# Patient Record
Sex: FEMALE | Race: WHITE | NOT HISPANIC OR LATINO
[De-identification: names, ages, dates, MRNs, and addresses within clinical notes are randomized per-mention and may not be internally consistent; named-entity substitution may affect disease eponyms.]

---

## 2020-04-26 ENCOUNTER — MESSAGE (OUTPATIENT)
Age: 30
End: 2020-04-26

## 2020-05-08 ENCOUNTER — APPOINTMENT (OUTPATIENT)
Dept: DISASTER EMERGENCY | Facility: HOSPITAL | Age: 30
End: 2020-05-08

## 2020-05-08 LAB
SARS-COV-2 IGG SERPL IA-ACNC: <0.1 INDEX
SARS-COV-2 IGG SERPL QL IA: NEGATIVE

## 2020-11-29 ENCOUNTER — EMERGENCY (EMERGENCY)
Facility: HOSPITAL | Age: 30
LOS: 1 days | Discharge: HOME | End: 2020-11-29
Attending: EMERGENCY MEDICINE | Admitting: EMERGENCY MEDICINE
Payer: COMMERCIAL

## 2020-11-29 VITALS
RESPIRATION RATE: 15 BRPM | OXYGEN SATURATION: 100 % | HEART RATE: 89 BPM | DIASTOLIC BLOOD PRESSURE: 91 MMHG | SYSTOLIC BLOOD PRESSURE: 136 MMHG

## 2020-11-29 VITALS
OXYGEN SATURATION: 100 % | HEART RATE: 99 BPM | RESPIRATION RATE: 16 BRPM | SYSTOLIC BLOOD PRESSURE: 138 MMHG | DIASTOLIC BLOOD PRESSURE: 89 MMHG | TEMPERATURE: 98 F

## 2020-11-29 PROCEDURE — 99283 EMERGENCY DEPT VISIT LOW MDM: CPT

## 2020-11-29 PROCEDURE — 72170 X-RAY EXAM OF PELVIS: CPT | Mod: 26

## 2020-11-29 PROCEDURE — 72100 X-RAY EXAM L-S SPINE 2/3 VWS: CPT | Mod: 26

## 2020-11-29 RX ORDER — LIDOCAINE 4 G/100G
1 CREAM TOPICAL ONCE
Refills: 0 | Status: COMPLETED | OUTPATIENT
Start: 2020-11-29 | End: 2020-11-29

## 2020-11-29 RX ORDER — IBUPROFEN 200 MG
1 TABLET ORAL
Qty: 25 | Refills: 0
Start: 2020-11-29

## 2020-11-29 RX ORDER — DIAZEPAM 5 MG
5 TABLET ORAL ONCE
Refills: 0 | Status: DISCONTINUED | OUTPATIENT
Start: 2020-11-29 | End: 2020-11-29

## 2020-11-29 RX ORDER — LIDOCAINE 4 G/100G
1 CREAM TOPICAL
Qty: 12 | Refills: 0
Start: 2020-11-29

## 2020-11-29 RX ORDER — KETOROLAC TROMETHAMINE 30 MG/ML
30 SYRINGE (ML) INJECTION ONCE
Refills: 0 | Status: DISCONTINUED | OUTPATIENT
Start: 2020-11-29 | End: 2020-11-29

## 2020-11-29 RX ORDER — DIAZEPAM 5 MG
1 TABLET ORAL
Qty: 10 | Refills: 0
Start: 2020-11-29

## 2020-11-29 RX ADMIN — Medication 30 MILLIGRAM(S): at 20:29

## 2020-11-29 RX ADMIN — LIDOCAINE 1 PATCH: 4 CREAM TOPICAL at 20:26

## 2020-11-29 RX ADMIN — Medication 5 MILLIGRAM(S): at 20:25

## 2020-11-29 NOTE — ED ADULT TRIAGE NOTE - CHIEF COMPLAINT QUOTE
pt c/o lower back pain, bilateral leg pain s/p mechanical fall down 3 steps yesterday. pt denies hitting head, LOC. pt arrives in wheelchair, stating it is difficult to walk. No PMH

## 2020-11-29 NOTE — ED PROVIDER NOTE - OBJECTIVE STATEMENT
patient is a 29 y/o F with no sig pmhx presenting with c.o lower back s/p fall down 3 step sustained yesterday. She report pain to the right lumbosacral region, burning in nature, difficulty with ambulating. She denies sustaining head trauma, no LOC, posttraumatic headache/dizziness, n/v, numbness, weakness, urinary/fecal incontinence saddle anesthesia.

## 2020-11-29 NOTE — ED PROVIDER NOTE - ATTENDING CONTRIBUTION TO CARE
DR. LAWS, ATTENDING MD-  I performed a face to face bedside interview with the patient regarding history of present illness, review of symptoms and past medical history. I completed an independent physical exam.  I have discussed the patient's plan of care with the PA.   Documentation as above in the note.    31 y/o female s/p fall down 3 steps yesterday with low back pain.  Non ttp spinous midline, contusion noted at right paraspinal lumbar region, coccyx ttp, motor 5/5 x4 ext, distal sensory grossly intact x4 ext, antalgic gait, normal heel to shin, normal finger to nose.  Obtain xr lspine  pelvis give pain med reassess, antic dc with pcp f/u.

## 2020-11-29 NOTE — ED PROVIDER NOTE - MUSCULOSKELETAL, MLM
back: tenderness and bruising to the right lumbosacral region. no midline spinal tenderness. lower extremities strength 5/5, sensation intact. no saddle anesthesia  patient able to ambulate.

## 2020-11-29 NOTE — ED PROVIDER NOTE - ADDITIONAL NOTES AND INSTRUCTIONS:
Patient was elevated at [facility] for non-COVID medical complaint. She is medically clear to return to work 12/2/2020 without limitations or restrictions. Patient was elevated at [facility] for non-COVID medical complaint. She is medically clear to return to work 12/7/2020 without limitations or restrictions.

## 2020-11-29 NOTE — ED PROVIDER NOTE - PATIENT PORTAL LINK FT
You can access the FollowMyHealth Patient Portal offered by [facility] by registering at the following website: [email or web address]. By joining [facility]’s FollowMyHealth portal, you will also be able to view your health information using other applications (apps) compatible with our system.

## 2020-11-29 NOTE — ED ADULT NURSE NOTE - NSIMPLEMENTINTERV_GEN_ALL_ED
Implemented All Fall Risk Interventions:  [place] to call system. Call bell, personal items and telephone within reach. Instruct patient to call for assistance. Room bathroom lighting operational. Non-slip footwear when patient is off stretcher. Physically safe environment: no spills, clutter or unnecessary equipment. Stretcher in lowest position, wheels locked, appropriate side rails in place. Provide visual cue, wrist band, yellow gown, etc. Monitor gait and stability. Monitor for mental status changes and reorient to person, place, and time. Review medications for side effects contributing to fall risk. Reinforce activity limits and safety measures with patient and family.

## 2020-11-29 NOTE — ED PROVIDER NOTE - CLINICAL SUMMARY MEDICAL DECISION MAKING FREE TEXT BOX
patient with back contusion s/p fall down 3 steps. PE unremarkable for midline spinal tenderness, bony deformities, or focal deficits. plan for analgesic, xray of LSPINE and pelvis

## 2020-11-29 NOTE — ED ADULT NURSE NOTE - OBJECTIVE STATEMENT
pt received to intake, a&ox 3, ambulatory, denies pmh , p/w back pain s/p fall. Pt breathing even and unlabored on room air. Denies fever, chills, cough, SOB, chest pain, palpitations, dizziness, N/V/D, constipation, numbness, tingling. pending UCG, pain medication, Xray.

## 2020-12-24 ENCOUNTER — APPOINTMENT (OUTPATIENT)
Dept: MAMMOGRAPHY | Facility: CLINIC | Age: 30
End: 2020-12-24

## 2020-12-24 ENCOUNTER — APPOINTMENT (OUTPATIENT)
Dept: ULTRASOUND IMAGING | Facility: CLINIC | Age: 30
End: 2020-12-24

## 2021-04-19 ENCOUNTER — RESULT REVIEW (OUTPATIENT)
Age: 31
End: 2021-04-19

## 2021-06-03 ENCOUNTER — APPOINTMENT (OUTPATIENT)
Dept: MAMMOGRAPHY | Facility: IMAGING CENTER | Age: 31
End: 2021-06-03
Payer: COMMERCIAL

## 2021-06-03 ENCOUNTER — OUTPATIENT (OUTPATIENT)
Dept: OUTPATIENT SERVICES | Facility: HOSPITAL | Age: 31
LOS: 1 days | End: 2021-06-03
Payer: COMMERCIAL

## 2021-06-03 ENCOUNTER — APPOINTMENT (OUTPATIENT)
Dept: ULTRASOUND IMAGING | Facility: IMAGING CENTER | Age: 31
End: 2021-06-03
Payer: COMMERCIAL

## 2021-06-03 DIAGNOSIS — Z00.8 ENCOUNTER FOR OTHER GENERAL EXAMINATION: ICD-10-CM

## 2021-06-03 PROCEDURE — 76641 ULTRASOUND BREAST COMPLETE: CPT | Mod: 26,50

## 2021-06-03 PROCEDURE — 77067 SCR MAMMO BI INCL CAD: CPT | Mod: 26

## 2021-06-03 PROCEDURE — 77063 BREAST TOMOSYNTHESIS BI: CPT | Mod: 26

## 2021-06-03 PROCEDURE — 76641 ULTRASOUND BREAST COMPLETE: CPT

## 2021-06-03 PROCEDURE — 77067 SCR MAMMO BI INCL CAD: CPT

## 2021-06-03 PROCEDURE — 77063 BREAST TOMOSYNTHESIS BI: CPT

## 2021-06-17 ENCOUNTER — OUTPATIENT (OUTPATIENT)
Dept: OUTPATIENT SERVICES | Facility: HOSPITAL | Age: 31
LOS: 1 days | End: 2021-06-17
Payer: COMMERCIAL

## 2021-06-17 ENCOUNTER — APPOINTMENT (OUTPATIENT)
Dept: ULTRASOUND IMAGING | Facility: IMAGING CENTER | Age: 31
End: 2021-06-17
Payer: COMMERCIAL

## 2021-06-17 DIAGNOSIS — Z00.8 ENCOUNTER FOR OTHER GENERAL EXAMINATION: ICD-10-CM

## 2021-06-17 PROCEDURE — 76642 ULTRASOUND BREAST LIMITED: CPT

## 2021-06-17 PROCEDURE — 76642 ULTRASOUND BREAST LIMITED: CPT | Mod: 26,50

## 2021-06-21 ENCOUNTER — TRANSCRIPTION ENCOUNTER (OUTPATIENT)
Age: 31
End: 2021-06-21

## 2021-06-30 ENCOUNTER — APPOINTMENT (OUTPATIENT)
Dept: ULTRASOUND IMAGING | Facility: IMAGING CENTER | Age: 31
End: 2021-06-30
Payer: COMMERCIAL

## 2021-06-30 ENCOUNTER — RESULT REVIEW (OUTPATIENT)
Age: 31
End: 2021-06-30

## 2021-06-30 ENCOUNTER — OUTPATIENT (OUTPATIENT)
Dept: OUTPATIENT SERVICES | Facility: HOSPITAL | Age: 31
LOS: 1 days | End: 2021-06-30
Payer: COMMERCIAL

## 2021-06-30 DIAGNOSIS — N63.0 UNSPECIFIED LUMP IN UNSPECIFIED BREAST: ICD-10-CM

## 2021-06-30 DIAGNOSIS — Z00.8 ENCOUNTER FOR OTHER GENERAL EXAMINATION: ICD-10-CM

## 2021-06-30 PROCEDURE — 19083 BX BREAST 1ST LESION US IMAG: CPT

## 2021-06-30 PROCEDURE — 77065 DX MAMMO INCL CAD UNI: CPT

## 2021-06-30 PROCEDURE — 88305 TISSUE EXAM BY PATHOLOGIST: CPT | Mod: 26

## 2021-06-30 PROCEDURE — 88305 TISSUE EXAM BY PATHOLOGIST: CPT

## 2021-06-30 PROCEDURE — 77065 DX MAMMO INCL CAD UNI: CPT | Mod: 26,LT

## 2021-06-30 PROCEDURE — 19083 BX BREAST 1ST LESION US IMAG: CPT | Mod: LT

## 2021-06-30 PROCEDURE — A4648: CPT

## 2021-07-12 ENCOUNTER — NON-APPOINTMENT (OUTPATIENT)
Age: 31
End: 2021-07-12

## 2021-07-12 ENCOUNTER — APPOINTMENT (OUTPATIENT)
Dept: CARDIOLOGY | Facility: CLINIC | Age: 31
End: 2021-07-12
Payer: COMMERCIAL

## 2021-07-12 VITALS — DIASTOLIC BLOOD PRESSURE: 80 MMHG | SYSTOLIC BLOOD PRESSURE: 130 MMHG

## 2021-07-12 VITALS
OXYGEN SATURATION: 98 % | TEMPERATURE: 97 F | WEIGHT: 191 LBS | HEIGHT: 63 IN | BODY MASS INDEX: 33.84 KG/M2 | HEART RATE: 94 BPM

## 2021-07-12 DIAGNOSIS — J30.9 ALLERGIC RHINITIS, UNSPECIFIED: ICD-10-CM

## 2021-07-12 DIAGNOSIS — R01.1 CARDIAC MURMUR, UNSPECIFIED: ICD-10-CM

## 2021-07-12 DIAGNOSIS — Z80.3 FAMILY HISTORY OF MALIGNANT NEOPLASM OF BREAST: ICD-10-CM

## 2021-07-12 DIAGNOSIS — Z82.49 FAMILY HISTORY OF ISCHEMIC HEART DISEASE AND OTHER DISEASES OF THE CIRCULATORY SYSTEM: ICD-10-CM

## 2021-07-12 DIAGNOSIS — Z80.1 FAMILY HISTORY OF MALIGNANT NEOPLASM OF TRACHEA, BRONCHUS AND LUNG: ICD-10-CM

## 2021-07-12 DIAGNOSIS — R06.89 OTHER ABNORMALITIES OF BREATHING: ICD-10-CM

## 2021-07-12 DIAGNOSIS — Z78.9 OTHER SPECIFIED HEALTH STATUS: ICD-10-CM

## 2021-07-12 DIAGNOSIS — Z87.898 PERSONAL HISTORY OF OTHER SPECIFIED CONDITIONS: ICD-10-CM

## 2021-07-12 DIAGNOSIS — S32.009A UNSPECIFIED FRACTURE OF UNSPECIFIED LUMBAR VERTEBRA, INITIAL ENCOUNTER FOR CLOSED FRACTURE: ICD-10-CM

## 2021-07-12 DIAGNOSIS — U07.1 COVID-19: ICD-10-CM

## 2021-07-12 PROCEDURE — 99204 OFFICE O/P NEW MOD 45 MIN: CPT

## 2021-07-12 PROCEDURE — 99072 ADDL SUPL MATRL&STAF TM PHE: CPT

## 2021-07-12 PROCEDURE — 93000 ELECTROCARDIOGRAM COMPLETE: CPT

## 2021-07-12 NOTE — ASSESSMENT
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  It is important to rule out cardiomyopathy as a potential cause of her arrhythmias.  She needs echo to evaluate right and left ventricular size and function and a telemetry monitor to assess the type and severity and frequency of the arrhythmia.

## 2021-07-12 NOTE — DISCUSSION/SUMMARY
[FreeTextEntry1] : I scheduled her for echocardiogram as well as a telemetry monitor for a month.  Based on the results further recommendations will follow.\par \par I highlighted the importance of diet and exercise to lose weight.

## 2021-07-12 NOTE — HISTORY OF PRESENT ILLNESS
[FreeTextEntry1] : 30 yr old lady came for cardiac evaluation on her own.  She was being followed in Dr. Detweiler's office.  She had history of heart murmur while growing up but it never bothered her..\par \par She was in her usual state of health until 28 November 2020 when she had Covid infection.  She had mild fever, shortness of breath, cough and chest discomfort while coughing.  In addition, she had loss of sense of taste and smell.   She stayed at home for about 10 days and symptoms subsided.  She got the first dose of Covid in 19 vaccine on December 31 and the second dose on January 28.\par \par On 26 November i.e. two days before getting Covid infection, she had an accidental fall down a flight of steps and fractured her lumbar spine.  She was not given any brace.  She rested for 3 weeks and then started walking and went back to work.  She was given painkillers and muscle relaxants.\par \par After the COVID-19 infection she started getting palpitation.  She feels extra beat and then there is a short pause and then she feels racing. The duration of racing may be from few seconds to an hour.  Sometimes there is dizziness and shortness of breath associated with the palpitation.  Some days she gets palpitation and other days she does not.  She can have days when she has palpitation several times in a day.\par \par Sometimes she has problem breathing.  She is getting frequent yawning and feels that it is not deep enough and feels short of breath and sometimes actually feels nauseous and gags.  She cannot catch her breath "anymore".  There is no history of syncope.  Lately the symptoms have been getting worse.  There is no leg edema.\par \par She gained about 10 to 15 pounds prior to fractured spine and Covid.  Since then she gained an additional 20 pounds.

## 2021-07-27 ENCOUNTER — APPOINTMENT (OUTPATIENT)
Dept: CARDIOLOGY | Facility: CLINIC | Age: 31
End: 2021-07-27

## 2021-08-30 ENCOUNTER — APPOINTMENT (OUTPATIENT)
Dept: CARDIOLOGY | Facility: CLINIC | Age: 31
End: 2021-08-30

## 2021-11-02 ENCOUNTER — RESULT REVIEW (OUTPATIENT)
Age: 31
End: 2021-11-02

## 2021-11-23 ENCOUNTER — APPOINTMENT (OUTPATIENT)
Dept: COLORECTAL SURGERY | Facility: CLINIC | Age: 31
End: 2021-11-23
Payer: COMMERCIAL

## 2021-11-23 VITALS
OXYGEN SATURATION: 95 % | BODY MASS INDEX: 32.78 KG/M2 | HEIGHT: 63 IN | WEIGHT: 185 LBS | HEART RATE: 81 BPM | RESPIRATION RATE: 14 BRPM | TEMPERATURE: 97.2 F | DIASTOLIC BLOOD PRESSURE: 69 MMHG | SYSTOLIC BLOOD PRESSURE: 102 MMHG

## 2021-11-23 DIAGNOSIS — R10.9 UNSPECIFIED ABDOMINAL PAIN: ICD-10-CM

## 2021-11-23 DIAGNOSIS — R14.0 ABDOMINAL DISTENSION (GASEOUS): ICD-10-CM

## 2021-11-23 DIAGNOSIS — K59.00 CONSTIPATION, UNSPECIFIED: ICD-10-CM

## 2021-11-23 DIAGNOSIS — Z78.9 OTHER SPECIFIED HEALTH STATUS: ICD-10-CM

## 2021-11-23 PROCEDURE — 99203 OFFICE O/P NEW LOW 30 MIN: CPT | Mod: 25

## 2021-11-23 PROCEDURE — 46600 DIAGNOSTIC ANOSCOPY SPX: CPT

## 2021-11-23 NOTE — HISTORY OF PRESENT ILLNESS
[FreeTextEntry1] : 32yo F pt presents with abd pain, pressure and bloating since Oct 28, pt has been feeling constipated since Halloween weekend, took milk of magnesia to have a BM and has been taking miralax and stopped just in the past 5 days. Says when pt tries to pass gas or has BM feels pressure on the lower abd and anus, strains with BM and doesn't feel fully evacuated. When pt sits down or presses on abd it feels bruised. Pt says the pressure and pain issue began after having intercourse with her boyfriend, has been visiting her OB/GYN and had a lot of tests done, all found normal. Hx of dealing with constipation on/off for her life. Denies family hx of colon CA, crohn's, UC.\par Tolerating diet no nausea or vomiting no fevers or chills

## 2021-11-23 NOTE — ASSESSMENT
[FreeTextEntry1] : Recent onset of abdominal pain and cramping constipation\par -No clear pathologic reason for change in bowel habits \par -Recommended patient attempt conservative therapy as her symptoms are relatively short lived\par -Patient will start fiber supplementation daily MiraLax as needed\par -If no improvement in the next 2-4 weeks, patient will followup in the office we will likely schedule abdominal CT scan and colonoscopy at that time\par -Patient agreed to this plan and will followup in the office as described\par -All questions were answered

## 2021-11-23 NOTE — CONSULT LETTER
[Dear  ___] : Dear  [unfilled], [Consult Letter:] : I had the pleasure of evaluating your patient, [unfilled]. [Please see my note below.] : Please see my note below. [Consult Closing:] : Thank you very much for allowing me to participate in the care of this patient.  If you have any questions, please do not hesitate to contact me. [Sincerely,] : Sincerely, [FreeTextEntry2] : Allan Detweiler [FreeTextEntry3] : Mike Callaway MD FACS\par Chief Colon and Rectal Surgery\par [facility]

## 2021-11-23 NOTE — PHYSICAL EXAM
[Normal Breath Sounds] : Normal breath sounds [Normal Heart Sounds] : normal heart sounds [Normal Rate and Rhythm] : normal rate and rhythm [Alert] : alert [Oriented to Person] : oriented to person [Oriented to Place] : oriented to place [Oriented to Time] : oriented to time [Calm] : calm [de-identified] : round soft +BS NT [de-identified] : well nourished female [de-identified] : NC/AT [de-identified] : +ROM [de-identified] : intact

## 2021-12-21 NOTE — ED ADULT TRIAGE NOTE - SOURCE OF INFORMATION
[FreeTextEntry1] : 37 yo female presents for high risk screening; she is without complaint. Discussed with the patient the implications for her lifetime risk, which is considered to be at high risk to develop breast cancer over the span of her lifetime and it is recommended that she undergoes high risk screening surveillance to include biannual radiological screening exams with a mammogram and screening MRI. Most recent MRI was BI-RADS 3 for b/l enhancement-- given recent benign mmg & US and stable breast exam, will have her proceed with the MRI in June 2022. 
Patient

## 2022-03-29 ENCOUNTER — APPOINTMENT (OUTPATIENT)
Dept: CARDIOLOGY | Facility: CLINIC | Age: 32
End: 2022-03-29

## 2022-04-25 NOTE — ED ADULT TRIAGE NOTE - PAIN RATING/NUMBER SCALE (0-10): REST
Medical Week 1 Survey    Flowsheet Row Responses   [facility] facility patient discharged from? [facility]   Does the patient have one of the following disease processes/diagnoses(primary or secondary)? Other   Week 1 attempt successful? No   Unsuccessful attempts Attempt 1          DARREN CARRILLO - Registered Nurse   10

## 2022-05-09 ENCOUNTER — OUTPATIENT (OUTPATIENT)
Dept: OUTPATIENT SERVICES | Facility: HOSPITAL | Age: 32
LOS: 1 days | Discharge: STILL ADMITTED OR ADMINISTRATIVE | End: 2022-05-09

## 2022-05-09 DIAGNOSIS — F43.23 ADJUSTMENT DISORDER WITH MIXED ANXIETY AND DEPRESSED MOOD: ICD-10-CM

## 2022-05-09 PROCEDURE — 90839 PSYTX CRISIS INITIAL 60 MIN: CPT | Mod: NC,1L,95

## 2022-05-12 ENCOUNTER — APPOINTMENT (OUTPATIENT)
Dept: PEDIATRIC ALLERGY IMMUNOLOGY | Facility: CLINIC | Age: 32
End: 2022-05-12

## 2022-06-07 ENCOUNTER — APPOINTMENT (OUTPATIENT)
Dept: INTERNAL MEDICINE | Facility: CLINIC | Age: 32
End: 2022-06-07
Payer: COMMERCIAL

## 2022-06-13 ENCOUNTER — APPOINTMENT (OUTPATIENT)
Dept: INTERNAL MEDICINE | Facility: CLINIC | Age: 32
End: 2022-06-13
Payer: COMMERCIAL

## 2022-06-13 VITALS
HEART RATE: 98 BPM | OXYGEN SATURATION: 98 % | DIASTOLIC BLOOD PRESSURE: 78 MMHG | TEMPERATURE: 98.7 F | BODY MASS INDEX: 35.44 KG/M2 | HEIGHT: 63 IN | WEIGHT: 200 LBS | SYSTOLIC BLOOD PRESSURE: 112 MMHG

## 2022-06-13 PROCEDURE — G0444 DEPRESSION SCREEN ANNUAL: CPT | Mod: 59

## 2022-06-13 PROCEDURE — 99204 OFFICE O/P NEW MOD 45 MIN: CPT | Mod: 25

## 2022-06-13 RX ORDER — ESCITALOPRAM OXALATE 10 MG/1
10 TABLET ORAL DAILY
Qty: 30 | Refills: 1 | Status: ACTIVE | COMMUNITY
Start: 2022-06-13 | End: 1900-01-01

## 2022-06-13 RX ORDER — NORETHINDRONE ACETATE AND ETHINYL ESTRADIOL AND FERROUS FUMARATE 1MG-20(24)
KIT ORAL
Refills: 0 | Status: ACTIVE | COMMUNITY

## 2022-06-13 RX ORDER — FLUOXETINE 20 MG/1
20 TABLET ORAL
Refills: 0 | Status: DISCONTINUED | COMMUNITY
End: 2022-06-13

## 2022-06-13 NOTE — HEALTH RISK ASSESSMENT
[Fair] :  ~his/her~ mood as fair [Never] : Never [Yes] : Yes [Monthly or less (1 pt)] : Monthly or less (1 point) [1 or 2 (0 pts)] : 1 or 2 (0 points) [Never (0 pts)] : Never (0 points) [No] : In the past 12 months have you used drugs other than those required for medical reasons? No [No falls in past year] : Patient reported no falls in the past year [3] : 2) Feeling down, depressed, or hopeless for nearly every day (3) [Patient reported PAP Smear was normal] : Patient reported PAP Smear was normal [HIV test declined] : HIV test declined [Hepatitis C test declined] : Hepatitis C test declined [Alone] : lives alone [Employed] : employed [Single] : single [Feels Safe at Home] : Feels safe at home [Reports changes in hearing] : Reports changes in hearing [Reports changes in vision] : Reports changes in vision [Smoke Detector] : smoke detector [Carbon Monoxide Detector] : carbon monoxide detector [Safety elements used in home] : safety elements used in home [Seat Belt] :  uses seat belt [Sunscreen] : uses sunscreen [PHQ-9 Positive] : PHQ-9 Positive [I have developed a follow-up plan documented below in the note.] : I have developed a follow-up plan documented below in the note. [Audit-CScore] : 1 [de-identified] : None [de-identified] : Poor diet [[id number]] : 6 [Reports changes in dental health] : Reports no changes in dental health [PapSmearDate] : 05/22

## 2022-06-13 NOTE — ASSESSMENT
[FreeTextEntry1] : anxiety and derpession\par - change prozac to lexapro\par - follow up with therapy\par follow up in 1 month\par \par

## 2022-06-13 NOTE — HISTORY OF PRESENT ILLNESS
[FreeTextEntry1] : depression and anxiety\par  [de-identified] : here for evaluation for anxiety and depression\par was started on fluoxetine \par made her feel worse\par pulled out her eye lashes\par has been on the medication and 1 month\par made her feel aggressive and sad\par \par

## 2022-07-08 ENCOUNTER — APPOINTMENT (OUTPATIENT)
Dept: INTERNAL MEDICINE | Facility: CLINIC | Age: 32
End: 2022-07-08

## 2022-07-20 ENCOUNTER — APPOINTMENT (OUTPATIENT)
Dept: ULTRASOUND IMAGING | Facility: IMAGING CENTER | Age: 32
End: 2022-07-20

## 2022-07-20 ENCOUNTER — APPOINTMENT (OUTPATIENT)
Dept: MAMMOGRAPHY | Facility: IMAGING CENTER | Age: 32
End: 2022-07-20

## 2022-07-20 ENCOUNTER — APPOINTMENT (OUTPATIENT)
Dept: INTERNAL MEDICINE | Facility: CLINIC | Age: 32
End: 2022-07-20

## 2022-07-20 VITALS
DIASTOLIC BLOOD PRESSURE: 80 MMHG | SYSTOLIC BLOOD PRESSURE: 114 MMHG | HEART RATE: 79 BPM | BODY MASS INDEX: 35.44 KG/M2 | WEIGHT: 200 LBS | TEMPERATURE: 97.9 F | OXYGEN SATURATION: 99 % | HEIGHT: 63 IN

## 2022-07-20 DIAGNOSIS — F32.A ANXIETY DISORDER, UNSPECIFIED: ICD-10-CM

## 2022-07-20 DIAGNOSIS — F41.9 ANXIETY DISORDER, UNSPECIFIED: ICD-10-CM

## 2022-07-20 PROCEDURE — 99213 OFFICE O/P EST LOW 20 MIN: CPT

## 2022-07-20 NOTE — HEALTH RISK ASSESSMENT
[Never] : Never [Yes] : Yes [Monthly or less (1 pt)] : Monthly or less (1 point) [1 or 2 (0 pts)] : 1 or 2 (0 points) [Never (0 pts)] : Never (0 points) [No] : In the past 12 months have you used drugs other than those required for medical reasons? No [No falls in past year] : Patient reported no falls in the past year [0] : 2) Feeling down, depressed, or hopeless: Not at all (0) [Audit-CScore] : 1 [de-identified] : little walking [de-identified] : regular [[id number]] : 0

## 2022-07-20 NOTE — HISTORY OF PRESENT ILLNESS
[FreeTextEntry1] : follow up [de-identified] : patient with anxiety was on a leave from work as was not able to function at her job\par seeing a psychiatrist and therapist\par was switched to lexapro and is helping and is ready to return to work\par needs a letter to return to work\par

## 2022-07-20 NOTE — ASSESSMENT
[FreeTextEntry1] : anxiety- better control on Lexapro\par \par will give letter to return to work\par \par

## 2022-11-08 ENCOUNTER — RESULT REVIEW (OUTPATIENT)
Age: 32
End: 2022-11-08

## 2022-12-05 ENCOUNTER — APPOINTMENT (OUTPATIENT)
Dept: ULTRASOUND IMAGING | Facility: IMAGING CENTER | Age: 32
End: 2022-12-05

## 2022-12-05 ENCOUNTER — APPOINTMENT (OUTPATIENT)
Dept: MAMMOGRAPHY | Facility: IMAGING CENTER | Age: 32
End: 2022-12-05

## 2022-12-05 ENCOUNTER — OUTPATIENT (OUTPATIENT)
Dept: OUTPATIENT SERVICES | Facility: HOSPITAL | Age: 32
LOS: 1 days | End: 2022-12-05
Payer: COMMERCIAL

## 2022-12-05 DIAGNOSIS — Z00.8 ENCOUNTER FOR OTHER GENERAL EXAMINATION: ICD-10-CM

## 2022-12-05 PROCEDURE — 77066 DX MAMMO INCL CAD BI: CPT | Mod: 26

## 2022-12-05 PROCEDURE — G0279: CPT

## 2022-12-05 PROCEDURE — G0279: CPT | Mod: 26

## 2022-12-05 PROCEDURE — 76641 ULTRASOUND BREAST COMPLETE: CPT

## 2022-12-05 PROCEDURE — 77065 DX MAMMO INCL CAD UNI: CPT

## 2022-12-05 PROCEDURE — 77066 DX MAMMO INCL CAD BI: CPT

## 2022-12-05 PROCEDURE — 76641 ULTRASOUND BREAST COMPLETE: CPT | Mod: 26,50

## 2022-12-27 ENCOUNTER — NON-APPOINTMENT (OUTPATIENT)
Age: 32
End: 2022-12-27

## 2022-12-29 ENCOUNTER — APPOINTMENT (OUTPATIENT)
Dept: SURGICAL ONCOLOGY | Facility: CLINIC | Age: 32
End: 2022-12-29
Payer: COMMERCIAL

## 2022-12-29 VITALS
DIASTOLIC BLOOD PRESSURE: 64 MMHG | HEIGHT: 63 IN | RESPIRATION RATE: 15 BRPM | BODY MASS INDEX: 32.78 KG/M2 | SYSTOLIC BLOOD PRESSURE: 112 MMHG | OXYGEN SATURATION: 99 % | WEIGHT: 185 LBS | HEART RATE: 55 BPM

## 2022-12-29 PROCEDURE — 99205 OFFICE O/P NEW HI 60 MIN: CPT

## 2022-12-29 NOTE — PHYSICAL EXAM
[Normal] : supple, no neck mass and thyroid not enlarged [Normal Supraclavicular Lymph Nodes] : normal supraclavicular lymph nodes [Normal Axillary Lymph Nodes] : normal axillary lymph nodes [Normal] : oriented to person, place and time, with appropriate affect [de-identified] : Fibrocystic changes but no masses

## 2022-12-29 NOTE — CONSULT LETTER
[Dear  ___] : Dear  [unfilled], [Consult Letter:] : I had the pleasure of evaluating your patient, [unfilled]. [Please see my note below.] : Please see my note below. [Consult Closing:] : Thank you very much for allowing me to participate in the care of this patient.  If you have any questions, please do not hesitate to contact me. [Sincerely,] : Sincerely, [FreeTextEntry1] : I will keep you informed of the final pathology. [FreeTextEntry3] : Satya Parks MD FACS\par Chief of Surgical Oncology\par \par

## 2022-12-29 NOTE — ASSESSMENT
[FreeTextEntry1] : IMP: 32 years old female present with an enlarging  left breast fibroadenoma \par \par PLAN: \par left breast Magaseed lumpectomy

## 2022-12-29 NOTE — HISTORY OF PRESENT ILLNESS
[de-identified] : Ms. ANGELA STIETZEL is a 32 year old female who present today for initial consultation for breast mass. \par PMH: denies \par Family history: Lung cancer in mother and triple negative breast cancer in sister at age 40. (sister is BRCA negative) \par \par B/L mammo/sono 6/4/21: Right breast mass and a subcentimeter right breast asymmetry. 10:00 left breast mass. BIRADS 0 \par Left breast 10:00 biopsy 6/30/21:  Fibroadenoma. \par \par B/L mammo/sono 12/5/22: Previously biopsied hypoechoic nodule at the 10:00 left breast is larger by mammo and sono ( 1.1 cm to 1.6 cm). surgical consultation recommended. Stable appearing nodules in the right breast. BIRADS 4

## 2023-01-24 ENCOUNTER — OUTPATIENT (OUTPATIENT)
Dept: OUTPATIENT SERVICES | Facility: HOSPITAL | Age: 33
LOS: 1 days | End: 2023-01-24

## 2023-01-24 VITALS
SYSTOLIC BLOOD PRESSURE: 132 MMHG | DIASTOLIC BLOOD PRESSURE: 83 MMHG | HEART RATE: 84 BPM | OXYGEN SATURATION: 97 % | WEIGHT: 188.94 LBS | TEMPERATURE: 98 F | HEIGHT: 63 IN | RESPIRATION RATE: 15 BRPM

## 2023-01-24 DIAGNOSIS — N63.0 UNSPECIFIED LUMP IN UNSPECIFIED BREAST: ICD-10-CM

## 2023-01-24 DIAGNOSIS — R09.89 OTHER SPECIFIED SYMPTOMS AND SIGNS INVOLVING THE CIRCULATORY AND RESPIRATORY SYSTEMS: ICD-10-CM

## 2023-01-24 DIAGNOSIS — Z98.890 OTHER SPECIFIED POSTPROCEDURAL STATES: Chronic | ICD-10-CM

## 2023-01-24 LAB
ANION GAP SERPL CALC-SCNC: 16 MMOL/L — HIGH (ref 7–14)
BUN SERPL-MCNC: 10 MG/DL — SIGNIFICANT CHANGE UP (ref 7–23)
CALCIUM SERPL-MCNC: 9.3 MG/DL — SIGNIFICANT CHANGE UP (ref 8.4–10.5)
CHLORIDE SERPL-SCNC: 101 MMOL/L — SIGNIFICANT CHANGE UP (ref 98–107)
CO2 SERPL-SCNC: 22 MMOL/L — SIGNIFICANT CHANGE UP (ref 22–31)
CREAT SERPL-MCNC: 0.52 MG/DL — SIGNIFICANT CHANGE UP (ref 0.5–1.3)
EGFR: 126 ML/MIN/1.73M2 — SIGNIFICANT CHANGE UP
GLUCOSE SERPL-MCNC: 81 MG/DL — SIGNIFICANT CHANGE UP (ref 70–99)
HCG SERPL-ACNC: <5 MIU/ML — SIGNIFICANT CHANGE UP
HCT VFR BLD CALC: 40.4 % — SIGNIFICANT CHANGE UP (ref 34.5–45)
HGB BLD-MCNC: 13.4 G/DL — SIGNIFICANT CHANGE UP (ref 11.5–15.5)
MCHC RBC-ENTMCNC: 30.2 PG — SIGNIFICANT CHANGE UP (ref 27–34)
MCHC RBC-ENTMCNC: 33.2 GM/DL — SIGNIFICANT CHANGE UP (ref 32–36)
MCV RBC AUTO: 91.2 FL — SIGNIFICANT CHANGE UP (ref 80–100)
NRBC # BLD: 0 /100 WBCS — SIGNIFICANT CHANGE UP (ref 0–0)
NRBC # FLD: 0 K/UL — SIGNIFICANT CHANGE UP (ref 0–0)
PLATELET # BLD AUTO: 314 K/UL — SIGNIFICANT CHANGE UP (ref 150–400)
POTASSIUM SERPL-MCNC: 3.6 MMOL/L — SIGNIFICANT CHANGE UP (ref 3.5–5.3)
POTASSIUM SERPL-SCNC: 3.6 MMOL/L — SIGNIFICANT CHANGE UP (ref 3.5–5.3)
RBC # BLD: 4.43 M/UL — SIGNIFICANT CHANGE UP (ref 3.8–5.2)
RBC # FLD: 13.6 % — SIGNIFICANT CHANGE UP (ref 10.3–14.5)
SODIUM SERPL-SCNC: 139 MMOL/L — SIGNIFICANT CHANGE UP (ref 135–145)
WBC # BLD: 10.16 K/UL — SIGNIFICANT CHANGE UP (ref 3.8–10.5)
WBC # FLD AUTO: 10.16 K/UL — SIGNIFICANT CHANGE UP (ref 3.8–10.5)

## 2023-01-24 NOTE — H&P PST ADULT - NSICDXFAMILYHX_GEN_ALL_CORE_FT
FAMILY HISTORY:  Mother  Still living? Unknown  Family hx of lung cancer, Age at diagnosis: Age Unknown    Sibling  Still living? Unknown  Family history of breast cancer, Age at diagnosis: Age Unknown

## 2023-01-24 NOTE — H&P PST ADULT - PROBLEM SELECTOR PLAN 1
Patient is tentatively scheduled for  surgery- Left breast MAGSEED localized lumpectomy x 1 site with Dr Parks on 02/08/2023.    Pre-op instructions provided. Pt given verbal and written instructions with teach back on chlorhexidine wash and pepcid. Pt verbalized understanding with return demonstration.    Routine Covid PCR test ordered .Instructions regarding covid PCR test and locations for covid testing site provided. Pt verbalized understanding  Urine cup provided for day of procedure pregnancy test.  CBC, BMP, HCG sent

## 2023-01-24 NOTE — H&P PST ADULT - ASSESSMENT
32 year old female with PMH of Anxiety, presents to [facility], with pre op diagnosis of left breast adenoma, for pre op evaluation prior to scheduled surgery- Left breast MAGSEED localized lumpectomy x 1 site with Dr Parks.

## 2023-01-24 NOTE — H&P PST ADULT - SKIN/BREAST COMMENTS
patient reports of PMH of left breast biopsy for benign neoplasm of breast, routine screening showed small lump

## 2023-01-24 NOTE — H&P PST ADULT - HISTORY OF PRESENT ILLNESS
32 year old female with PMH of Anxiety, presents to [facility], with pre op diagnosis of left breast adenoma, for pre op evaluation prior to scheduled surgery- Left breast MAGSEED localized lumpectomy x 1 site with Dr Parks.

## 2023-01-24 NOTE — H&P PST ADULT - CARDIOVASCULAR COMMENTS
METs- 5 walks, goes to gym occasionally, can climb stairs patient reports she had occasional palpitation in 2021- followed up with cardiology then- normal EKG

## 2023-02-03 ENCOUNTER — OUTPATIENT (OUTPATIENT)
Dept: OUTPATIENT SERVICES | Facility: HOSPITAL | Age: 33
LOS: 1 days | End: 2023-02-03
Payer: COMMERCIAL

## 2023-02-03 ENCOUNTER — APPOINTMENT (OUTPATIENT)
Dept: ULTRASOUND IMAGING | Facility: IMAGING CENTER | Age: 33
End: 2023-02-03
Payer: COMMERCIAL

## 2023-02-03 ENCOUNTER — RESULT REVIEW (OUTPATIENT)
Age: 33
End: 2023-02-03

## 2023-02-03 DIAGNOSIS — Z98.890 OTHER SPECIFIED POSTPROCEDURAL STATES: Chronic | ICD-10-CM

## 2023-02-03 DIAGNOSIS — Z00.8 ENCOUNTER FOR OTHER GENERAL EXAMINATION: ICD-10-CM

## 2023-02-03 PROBLEM — Z87.898 PERSONAL HISTORY OF OTHER SPECIFIED CONDITIONS: Chronic | Status: ACTIVE | Noted: 2023-01-24

## 2023-02-03 PROBLEM — F41.9 ANXIETY DISORDER, UNSPECIFIED: Chronic | Status: ACTIVE | Noted: 2023-01-24

## 2023-02-03 PROCEDURE — 19285 PERQ DEV BREAST 1ST US IMAG: CPT | Mod: LT

## 2023-02-03 PROCEDURE — 19285 PERQ DEV BREAST 1ST US IMAG: CPT

## 2023-02-03 PROCEDURE — A4648: CPT

## 2023-02-07 ENCOUNTER — TRANSCRIPTION ENCOUNTER (OUTPATIENT)
Age: 33
End: 2023-02-07

## 2023-02-08 ENCOUNTER — OUTPATIENT (OUTPATIENT)
Dept: OUTPATIENT SERVICES | Facility: HOSPITAL | Age: 33
LOS: 1 days | End: 2023-02-08
Payer: COMMERCIAL

## 2023-02-08 ENCOUNTER — APPOINTMENT (OUTPATIENT)
Dept: SURGICAL ONCOLOGY | Facility: AMBULATORY SURGERY CENTER | Age: 33
End: 2023-02-08

## 2023-02-08 ENCOUNTER — TRANSCRIPTION ENCOUNTER (OUTPATIENT)
Age: 33
End: 2023-02-08

## 2023-02-08 ENCOUNTER — APPOINTMENT (OUTPATIENT)
Dept: MAMMOGRAPHY | Facility: IMAGING CENTER | Age: 33
End: 2023-02-08
Payer: COMMERCIAL

## 2023-02-08 ENCOUNTER — RESULT REVIEW (OUTPATIENT)
Age: 33
End: 2023-02-08

## 2023-02-08 ENCOUNTER — OUTPATIENT (OUTPATIENT)
Dept: OUTPATIENT SERVICES | Facility: HOSPITAL | Age: 33
LOS: 1 days | Discharge: HOME | End: 2023-02-08
Payer: COMMERCIAL

## 2023-02-08 VITALS
OXYGEN SATURATION: 99 % | HEART RATE: 83 BPM | SYSTOLIC BLOOD PRESSURE: 125 MMHG | RESPIRATION RATE: 17 BRPM | TEMPERATURE: 98 F | DIASTOLIC BLOOD PRESSURE: 73 MMHG

## 2023-02-08 VITALS
TEMPERATURE: 99 F | RESPIRATION RATE: 18 BRPM | HEIGHT: 63 IN | HEART RATE: 70 BPM | OXYGEN SATURATION: 99 % | SYSTOLIC BLOOD PRESSURE: 129 MMHG | WEIGHT: 188.94 LBS | DIASTOLIC BLOOD PRESSURE: 83 MMHG

## 2023-02-08 DIAGNOSIS — Z00.8 ENCOUNTER FOR OTHER GENERAL EXAMINATION: ICD-10-CM

## 2023-02-08 DIAGNOSIS — N63.0 UNSPECIFIED LUMP IN UNSPECIFIED BREAST: ICD-10-CM

## 2023-02-08 DIAGNOSIS — Z98.890 OTHER SPECIFIED POSTPROCEDURAL STATES: Chronic | ICD-10-CM

## 2023-02-08 PROCEDURE — 14001 TIS TRNFR TRUNK 10.1-30SQCM: CPT

## 2023-02-08 PROCEDURE — 76098 X-RAY EXAM SURGICAL SPECIMEN: CPT

## 2023-02-08 PROCEDURE — 19301 PARTIAL MASTECTOMY: CPT

## 2023-02-08 PROCEDURE — 76098 X-RAY EXAM SURGICAL SPECIMEN: CPT | Mod: 26

## 2023-02-08 PROCEDURE — 88305 TISSUE EXAM BY PATHOLOGIST: CPT | Mod: 26

## 2023-02-08 RX ORDER — NORETHINDRONE AND ETHINYL ESTRADIOL 0.4-0.035
1 KIT ORAL
Qty: 0 | Refills: 0 | DISCHARGE

## 2023-02-08 RX ORDER — OMEPRAZOLE 10 MG/1
1 CAPSULE, DELAYED RELEASE ORAL
Qty: 0 | Refills: 0 | DISCHARGE

## 2023-02-08 NOTE — ASU DISCHARGE PLAN (ADULT/PEDIATRIC) - NS MD DC FALL RISK RISK
For information on Fall & Injury Prevention, visit: [email or web address] OR  [email or web address] OR  https://www.cdc.gov/steadi/patient.html

## 2023-02-08 NOTE — ASU DISCHARGE PLAN (ADULT/PEDIATRIC) - ACTIVITY LEVEL
Home Care Instructions after your Radiofrequency Ablation    · You may remove the bandages after several hours.  · Rest as much as you need to.  Go back to your normal activities as tolerated.  · Expect some bruising and soreness around the procedure site for about 5 to 7 days.  · It is very important to know that at first you may feel a warm sensation in the affected area.  This is normal after the procedure, and it may last for several months. Please keep this in mind when planning your activities after the procedure.  · Most patients have a significant decrease in their pain around three weeks after the procedure. Pain relief can last from three months to two years or longer.  If the original pain returns, the procedure can be repeated.    Call Us if:    · You have abnormal bleeding, persistent chills or a fever over 100 degrees F.  · The site appears red, swollen and warm to the touch, or has any kind of drainage.       If you stopped taking your blood thinning medicine, check with your doctor about taking this again.    In case of emergency, call your doctor.  If you cannot reach a doctor, go to the nearest emergency room and ask them to call your doctor.     Care After Anesthesia or Sedation    After discharge  • Due to the medicine given, someone must drive you home. It is strongly recommended to have someone stay with you at home the day of discharge and the night after surgery.  • If you have infants or small children at home, please have someone help you for at least 24 hours after your surgery.  • Do not drive for at least 24 hours after surgery (or as told by your doctor).  • Rest for the remainder of the day. Go up and down stairs slowly.  • Do not smoke after surgery. Smoking can delay healing.  • Do not operate heavy or potential harmful equipment.  • Do not make legally binding decisions.  • Do not drink alcohol for 24 hours.    Diet  • Nausea may be expected for the first 24 to 48 hours. Start  eating a bland diet (toast, gelatin, 7-up, hot cereal, crackers, sherbet, broth soup).  • Drink plenty of fluids (6 to 8 glasses of water a day).  • Resume your regular diet as able.  • Avoid greasy or spicy foods for 24 hours.    Urination  • The effects of anesthetics may cause some people to have trouble passing urine the day of surgery. Drink a lot of fluids to help prevent this.  • Try to urinate within 8 hours of surgery.  • If you are unable to pass urine and feel like you need to, call your doctor or the hospital.    Pain control  • Some incisions are injected with a long acting local anesthetic; it will wear off in 4 to 6 hours. You can expect to have some pain at this time.  • Treat your pain with the prescribed pain medicine before it wears off. Do not wait until your pain becomes severe.  • Ask your nurse when you had your last pain medicine, so you know when you can take another one after you get home.    Call your doctor if you have:  • Nausea and vomiting that does not stop  • Fever over 101° F  • Pain not relieved by pain medication  • If you are unable to pass your urine or you have not passed urine in the last 8 hours.  • Unusual changes in behavior  • Dizziness  • Hives  If you are not able to reach your doctor, you may call the emergency department.    If you have any concerns following your Procedure/Surgery and cannot reach your physician's office, please contact [facility] at [phone] for assistance.        Care After Anesthesia or Sedation    After discharge  • Due to the medicine given, someone must drive you home. It is strongly recommended to have someone stay with you at home the day of discharge and the night after surgery.  • If you have infants or small children at home, please have someone help you for at least 24 hours after your surgery.  • Do not drive for at least 24 hours after surgery (or as told by your doctor).  • Rest for the remainder of the day. Go  up and down stairs slowly.  • Do not smoke after surgery. Smoking can delay healing.  • Do not operate heavy or potential harmful equipment.  • Do not make legally binding decisions.  • Do not drink alcohol for 24 hours.    Diet  • Nausea may be expected for the first 24 to 48 hours. Start eating a bland diet (toast, gelatin, 7-up, hot cereal, crackers, sherbet, broth soup).  • Drink plenty of fluids (6 to 8 glasses of water a day).  • Resume your regular diet as able.  • Avoid greasy or spicy foods for 24 hours.    Urination  • The effects of anesthetics may cause some people to have trouble passing urine the day of surgery. Drink a lot of fluids to help prevent this.  • Try to urinate within 8 hours of surgery.  • If you are unable to pass urine and feel like you need to, call your doctor or the hospital.    Pain control  • Some incisions are injected with a long acting local anesthetic; it will wear off in 4 to 6 hours. You can expect to have some pain at this time.  • Treat your pain with the prescribed pain medicine before it wears off. Do not wait until your pain becomes severe.  • Ask your nurse when you had your last pain medicine, so you know when you can take another one after you get home.    Call your doctor if you have:  • Nausea and vomiting that does not stop  • Fever over 101° F  • Pain not relieved by pain medication  • If you are unable to pass your urine or you have not passed urine in the last 8 hours.  • Unusual changes in behavior  • Dizziness  • Hives  If you are not able to reach your doctor, you may call the emergency department.    Wound Healing: What You Should Know    Do I have an infection?  Your body may show signs your wound is infected. If you see one or more of these signs, please call your health care provider:  • Redness and swelling - Increased redness and swelling around the wound (some redness is expected in the first 48 hours).  • Warmth - The area around the wound is warmer  than the surrounding skin.  • Fever - Your temperature is above 101º F or stays above 100º F.  • Odor - A new or stronger, sweet or foul smell coming from the wound.  • Swelling - Swelling spreading to other areas.  • Drainage - Large amounts of drainage that involves blood, clear fluid or pus from the wound. Or, the drainage amount increases or changes color. (It is normal to have a small amount of drainage.)  • Pain - Increase in your pain or change in the location of the pain.  • Separation - Any place where the incision is  or opening.    How can I help prevent the spread of infection when I take care of my wound?  1. Use good handwashing techniques before and after contact with your wound. Here are the steps to follow:  Handwashing with soap and water:  • Wet hands with warm water and apply soap.  • Rub well over all surfaces for at least 15 seconds.  • Rinse well under water.  • Dry hands with clean towels.  • Turn off faucet with clean towels to prevent reinfecting hands.  Handwashing with a waterless alcohol-based product or gel:  • Apply approximately a nickel sized amount of product to palm of hand.  • Rub hands together, covering all surfaces including nails, until product evaporates, about 10 to 15 seconds.  2. Use clean latex or vinyl gloves if cleaning or touching wound surface.  3. Keep nails short and remove nail polish. Long nails or polish can harbor bacteria.  4. Keep jewelry clean or remove during wound care.  5. Use hypoallergenic lotions with anti-bacterial agents on skin surrounding wound to maintain moisture and prevent irritation.    What else can I do to help my wound heal quickly and prevent other wounds?  • Stay active - Activity and exercise promote good circulation, which leads to wound healing.  • Avoid smoking - Smoking narrows your blood vessels, which decreases both the blood supply to your wound and the amount of oxygen in your blood. This will decrease your ability to  heal and increase your chance of infection.  • Avoid alcohol - Alcohol decreases the ability of your body to fight infection.  • Avoid sitting with legs or ankles crossed - This can compress the blood vessels in your legs and decrease the blood supply to your wound.  • Eat a balanced diet - If you are unable to eat a balanced diet or required foods, you may benefit from a vitamin or mineral supplement.    Pain Management  Special Note: Be sure to follow any specific post-op instructions from your surgeon or nurse.   Once you’re home, you may have some pain, since even minor surgery causes swelling and breakdown of tissue. When it comes to effective pain management, the tips you learned in the hospital also work at home. To get the best pain relief possible, remember these points:    Use your medication only as directed  · If your pain is not relieved or if it gets worse, call your doctor.  · If pain lessens, try taking your medication less often.  Remember that medications need time to work  · Most pain relievers taken by mouth need at least 20-30 minutes to take effect.  · Take pain medication at regular times as directed. Don’t wait until the pain gets bad to take it.  Time your medication  · Try to time your medication so that you take it before beginning an activity, such as dressing or sitting at the table for dinner.  · Taking your medication at night may help you get a good night’s rest.  Eat lots of fruit and vegetables  · Constipation is a common side effect with some pain medications. Eating fruit and vegetables can help.  · Drink lots of fluids.  · Avoid laxatives unless your surgeon has prescribed them for you.  Avoid drinking alcohol while taking pain medication  · Mixing alcohol and pain medication can cause dizziness and slow your respiratory system. It can even be fatal.  · Avoid driving or operating machinery while taking pain medication.       No excercise/No heavy lifting/No sports/gym

## 2023-02-08 NOTE — ASU DISCHARGE PLAN (ADULT/PEDIATRIC) - CARE PROVIDER_API CALL
Satya Parks)  Surgery  [address] [place] [address], [place] [address]  Phone: [phone]  Fax: [phone]  Follow Up Time: 2 weeks

## 2023-02-08 NOTE — ASU PREOPERATIVE ASSESSMENT, ADULT (IPARK ONLY) - FALL HARM RISK - UNIVERSAL INTERVENTIONS
Bed in lowest position, wheels locked, appropriate side rails in place/Call bell, personal items and telephone in reach/Instruct patient to call for assistance before getting out of bed or chair/Non-slip footwear when patient is out of bed/[place] to call system/Physically safe environment - no spills, clutter or unnecessary equipment/Purposeful Proactive Rounding/Room/bathroom lighting operational, light cord in reach

## 2023-02-08 NOTE — ASU PREOPERATIVE ASSESSMENT, ADULT (IPARK ONLY) - INV PERIPH IV DEVICE
"Patient Information     Patient Name MRN Katt Pittman [id number] Female 1935      Telephone Encounter by Enriqueta Mustafa at 2018 12:39 PM     Author:  Enriqueta Mustafa Service:  (none) Author Type:  (none)     Filed:  2018 12:51 PM Encounter Date:  2018 Status:  Signed     :  Enriqueta Mustafa            Fax found in Colin Garcia MD in box from 17. Fax is from [employer] stating \"xiomy is packing food in her checks and eating very little. She does drink very well\". Requesting an order for a liquid supplement like Boost, Ensure. Awaiting response.  Fax put in Na Stoner MD in box for orders as Colin Garcia MD is not back until Monday and this has been sitting here since Tuesday.  Enriqueta Mustafa LPN 2018 12:49 PM         "
Patient Information     Patient Name MRN Katt Pittman [id number] Female 1935      Telephone Encounter by Enriqueta Mustafa at 2018  2:23 PM     Author:  Enriqueta Mustafa Service:  (none) Author Type:  (none)     Filed:  2018  2:25 PM Encounter Date:  2018 Status:  Signed     :  Enriqueta Mustafa            Prescription faxed to [facility]. [facility] Notified.  Enriqueta Mustafa LPN 2018 2:23 PM         
Patient Information     Patient Name MRN Katt Pittman [id number] Female 1935      Telephone Encounter by Na Coronel MD at 2018 12:59 PM     Author:  Na Coronel MD Service:  (none) Author Type:  Physician     Filed:  2018 12:59 PM Encounter Date:  2018 Status:  Signed     :  Na Coronel MD (Physician)            Prescription in my outbox  Na Stoner MD         
[facility]

## 2023-02-14 LAB — SURGICAL PATHOLOGY STUDY: SIGNIFICANT CHANGE UP

## 2023-02-23 ENCOUNTER — APPOINTMENT (OUTPATIENT)
Dept: SURGICAL ONCOLOGY | Facility: CLINIC | Age: 33
End: 2023-02-23
Payer: COMMERCIAL

## 2023-02-23 VITALS
BODY MASS INDEX: 32.07 KG/M2 | DIASTOLIC BLOOD PRESSURE: 82 MMHG | OXYGEN SATURATION: 98 % | RESPIRATION RATE: 15 BRPM | WEIGHT: 181 LBS | HEIGHT: 63 IN | HEART RATE: 89 BPM | SYSTOLIC BLOOD PRESSURE: 134 MMHG

## 2023-02-23 DIAGNOSIS — N63.0 UNSPECIFIED LUMP IN UNSPECIFIED BREAST: ICD-10-CM

## 2023-02-23 PROCEDURE — 99024 POSTOP FOLLOW-UP VISIT: CPT

## 2023-02-23 NOTE — CONSULT LETTER
[Dear  ___] : Dear  [unfilled], [Courtesy Letter:] : I had the pleasure of seeing your patient, [unfilled], in my office today. [Please see my note below.] : Please see my note below. [Consult Closing:] : Thank you very much for allowing me to participate in the care of this patient.  If you have any questions, please do not hesitate to contact me. [Sincerely,] : Sincerely, [FreeTextEntry1] : I will see her yearly with her breast imaging because of her strong family hx. of breast cancer. [FreeTextEntry3] : Satya Parks MD FACS\par Chief of Surgical Oncology\par \par

## 2023-02-23 NOTE — ASSESSMENT
[FreeTextEntry1] : IMP: 32 years old female present with an enlarging  left breast fibroadenoma \par \par S/P left breast excision 2/8/23: fibroadenoma. \par \par PLAN: \par b/l mammo/sono 12/2023\par RTO December 2023\par

## 2023-02-23 NOTE — HISTORY OF PRESENT ILLNESS
[de-identified] : Ms. ANGELA STIETZEL is a 32 year old female who present today for  post op visit \par \par PMH: denies \par Family history: Lung cancer in mother and triple negative breast cancer in sister at age 40. (sister is BRCA negative) \par \par S/P left breast excision 2/8/23: fibroadenoma. Benign margins \par \par Pertinent History: \par B/L mammo/sono 6/4/21: Right breast mass and a subcentimeter right breast asymmetry. 10:00 left breast mass. BIRADS 0 \par Left breast 10:00 biopsy 6/30/21:  Fibroadenoma. \par \par B/L mammo/sono 12/5/22: Previously biopsied hypoechoic nodule at the 10:00 left breast is larger by mammo and sono ( 1.1 cm to 1.6 cm). surgical consultation recommended. Stable appearing nodules in the right breast. BIRADS 4

## 2023-06-28 ENCOUNTER — APPOINTMENT (OUTPATIENT)
Dept: INTERNAL MEDICINE | Facility: CLINIC | Age: 33
End: 2023-06-28

## 2023-09-20 ENCOUNTER — APPOINTMENT (OUTPATIENT)
Dept: CARDIOLOGY | Facility: CLINIC | Age: 33
End: 2023-09-20
Payer: COMMERCIAL

## 2023-09-20 VITALS
BODY MASS INDEX: 33.31 KG/M2 | TEMPERATURE: 97.5 F | OXYGEN SATURATION: 99 % | WEIGHT: 188 LBS | HEIGHT: 63 IN | SYSTOLIC BLOOD PRESSURE: 115 MMHG | HEART RATE: 87 BPM | DIASTOLIC BLOOD PRESSURE: 76 MMHG

## 2023-09-20 DIAGNOSIS — R00.2 PALPITATIONS: ICD-10-CM

## 2023-09-20 DIAGNOSIS — R94.31 ABNORMAL ELECTROCARDIOGRAM [ECG] [EKG]: ICD-10-CM

## 2023-09-20 PROCEDURE — 93000 ELECTROCARDIOGRAM COMPLETE: CPT

## 2023-09-20 PROCEDURE — 99215 OFFICE O/P EST HI 40 MIN: CPT

## 2023-09-21 LAB
ALBUMIN SERPL ELPH-MCNC: 4.8 G/DL
ALP BLD-CCNC: 63 U/L
ALT SERPL-CCNC: 17 U/L
ANION GAP SERPL CALC-SCNC: 17 MMOL/L
AST SERPL-CCNC: 21 U/L
BASOPHILS # BLD AUTO: 0.05 K/UL
BASOPHILS NFR BLD AUTO: 0.6 %
BILIRUB SERPL-MCNC: 0.2 MG/DL
BUN SERPL-MCNC: 8 MG/DL
CALCIUM SERPL-MCNC: 10.3 MG/DL
CHLORIDE SERPL-SCNC: 103 MMOL/L
CHOLEST SERPL-MCNC: 217 MG/DL
CO2 SERPL-SCNC: 21 MMOL/L
CREAT SERPL-MCNC: 0.51 MG/DL
EGFR: 126 ML/MIN/1.73M2
EOSINOPHIL # BLD AUTO: 0.12 K/UL
EOSINOPHIL NFR BLD AUTO: 1.4 %
GLUCOSE SERPL-MCNC: 74 MG/DL
HCT VFR BLD CALC: 44.3 %
HDLC SERPL-MCNC: 66 MG/DL
HGB BLD-MCNC: 14.2 G/DL
IMM GRANULOCYTES NFR BLD AUTO: 0.2 %
LDLC SERPL CALC-MCNC: 136 MG/DL
LYMPHOCYTES # BLD AUTO: 3.3 K/UL
LYMPHOCYTES NFR BLD AUTO: 37.4 %
MAN DIFF?: NORMAL
MCHC RBC-ENTMCNC: 31.3 PG
MCHC RBC-ENTMCNC: 32.1 GM/DL
MCV RBC AUTO: 97.6 FL
MONOCYTES # BLD AUTO: 0.68 K/UL
MONOCYTES NFR BLD AUTO: 7.7 %
NEUTROPHILS # BLD AUTO: 4.65 K/UL
NEUTROPHILS NFR BLD AUTO: 52.7 %
NONHDLC SERPL-MCNC: 151 MG/DL
PLATELET # BLD AUTO: 320 K/UL
POTASSIUM SERPL-SCNC: 4.5 MMOL/L
PROT SERPL-MCNC: 7.3 G/DL
RBC # BLD: 4.54 M/UL
RBC # FLD: 13.8 %
SODIUM SERPL-SCNC: 140 MMOL/L
TRIGL SERPL-MCNC: 82 MG/DL
TSH SERPL-ACNC: 4.09 UIU/ML
WBC # FLD AUTO: 8.82 K/UL

## 2023-10-02 ENCOUNTER — APPOINTMENT (OUTPATIENT)
Dept: CARDIOLOGY | Facility: CLINIC | Age: 33
End: 2023-10-02

## 2023-10-09 ENCOUNTER — APPOINTMENT (OUTPATIENT)
Dept: CARDIOLOGY | Facility: CLINIC | Age: 33
End: 2023-10-09
Payer: COMMERCIAL

## 2023-10-09 PROCEDURE — 93242 EXT ECG>48HR<7D RECORDING: CPT

## 2023-10-09 PROCEDURE — 93306 TTE W/DOPPLER COMPLETE: CPT

## 2023-12-05 ENCOUNTER — APPOINTMENT (OUTPATIENT)
Dept: CARDIOLOGY | Facility: CLINIC | Age: 33
End: 2023-12-05

## 2024-02-26 ENCOUNTER — TRANSCRIPTION ENCOUNTER (OUTPATIENT)
Age: 34
End: 2024-02-26

## 2024-04-03 ENCOUNTER — APPOINTMENT (OUTPATIENT)
Dept: INTERNAL MEDICINE | Facility: CLINIC | Age: 34
End: 2024-04-03
Payer: COMMERCIAL

## 2024-04-03 VITALS
SYSTOLIC BLOOD PRESSURE: 124 MMHG | TEMPERATURE: 97.9 F | HEART RATE: 77 BPM | OXYGEN SATURATION: 97 % | WEIGHT: 185 LBS | DIASTOLIC BLOOD PRESSURE: 88 MMHG | HEIGHT: 63 IN | BODY MASS INDEX: 32.78 KG/M2

## 2024-04-03 DIAGNOSIS — J02.9 ACUTE PHARYNGITIS, UNSPECIFIED: ICD-10-CM

## 2024-04-03 DIAGNOSIS — K21.9 GASTRO-ESOPHAGEAL REFLUX DISEASE W/OUT ESOPHAGITIS: ICD-10-CM

## 2024-04-03 PROCEDURE — 99213 OFFICE O/P EST LOW 20 MIN: CPT

## 2024-04-03 RX ORDER — AMOXICILLIN AND CLAVULANATE POTASSIUM 875; 125 MG/1; MG/1
875-125 TABLET, COATED ORAL TWICE DAILY
Qty: 20 | Refills: 0 | Status: ACTIVE | COMMUNITY
Start: 2024-04-03 | End: 1900-01-01

## 2024-04-03 RX ORDER — OMEPRAZOLE 20 MG/1
20 CAPSULE, DELAYED RELEASE ORAL
Qty: 90 | Refills: 1 | Status: ACTIVE | COMMUNITY
Start: 2024-04-03 | End: 1900-01-01

## 2024-04-03 NOTE — ASSESSMENT
[FreeTextEntry1] : sore throat- strep augmentin ordered  GERD- omeprazole prdered  RTO if symptoms worsen or fail to improve

## 2024-04-03 NOTE — HISTORY OF PRESENT ILLNESS
[Moderate] : moderate [___ Days ago] : [unfilled] days ago [Sudden] : suddenly [Constant] : constant [Sore Throat] : sore throat [Headache] : headache [OTC Remedies] : OTC remedies [Worsening] : worsening [Congestion] : no congestion [Cough] : no cough [Wheezing] : no wheezing [Chills] : no chills [Anorexia] : no anorexia [Shortness Of Breath] : no shortness of breath [Earache] : no earache [Fatigue] : not fatigue [Fever] : no fever [FreeTextEntry5] : cough medicine  [FreeTextEntry4] : N/A [FreeTextEntry8] : sore throat  started testerday getting worse  hurts to swallow no fever  Nasim Taylor(Resident)

## 2024-04-16 ENCOUNTER — OUTPATIENT (OUTPATIENT)
Dept: OUTPATIENT SERVICES | Facility: HOSPITAL | Age: 34
LOS: 1 days | End: 2024-04-16
Payer: COMMERCIAL

## 2024-04-16 ENCOUNTER — APPOINTMENT (OUTPATIENT)
Dept: ULTRASOUND IMAGING | Facility: IMAGING CENTER | Age: 34
End: 2024-04-16
Payer: COMMERCIAL

## 2024-04-16 ENCOUNTER — APPOINTMENT (OUTPATIENT)
Dept: MAMMOGRAPHY | Facility: IMAGING CENTER | Age: 34
End: 2024-04-16
Payer: COMMERCIAL

## 2024-04-16 ENCOUNTER — RESULT REVIEW (OUTPATIENT)
Age: 34
End: 2024-04-16

## 2024-04-16 DIAGNOSIS — Z00.8 ENCOUNTER FOR OTHER GENERAL EXAMINATION: ICD-10-CM

## 2024-04-16 DIAGNOSIS — Z98.890 OTHER SPECIFIED POSTPROCEDURAL STATES: Chronic | ICD-10-CM

## 2024-04-16 PROCEDURE — 77063 BREAST TOMOSYNTHESIS BI: CPT | Mod: 26

## 2024-04-16 PROCEDURE — 77063 BREAST TOMOSYNTHESIS BI: CPT

## 2024-04-16 PROCEDURE — G0279: CPT

## 2024-04-16 PROCEDURE — 77067 SCR MAMMO BI INCL CAD: CPT | Mod: 26

## 2024-04-16 PROCEDURE — 76641 ULTRASOUND BREAST COMPLETE: CPT

## 2024-04-16 PROCEDURE — 76641 ULTRASOUND BREAST COMPLETE: CPT | Mod: 26,50

## 2024-04-16 PROCEDURE — 77066 DX MAMMO INCL CAD BI: CPT

## 2024-04-16 PROCEDURE — 77067 SCR MAMMO BI INCL CAD: CPT

## 2024-07-09 ENCOUNTER — EMERGENCY (EMERGENCY)
Facility: HOSPITAL | Age: 34
LOS: 0 days | Discharge: HOME | End: 2024-07-09
Attending: STUDENT IN AN ORGANIZED HEALTH CARE EDUCATION/TRAINING PROGRAM
Payer: COMMERCIAL

## 2024-07-09 VITALS
SYSTOLIC BLOOD PRESSURE: 130 MMHG | RESPIRATION RATE: 20 BRPM | HEIGHT: 63 IN | OXYGEN SATURATION: 100 % | HEART RATE: 92 BPM | TEMPERATURE: 98 F | WEIGHT: 179.9 LBS | DIASTOLIC BLOOD PRESSURE: 87 MMHG

## 2024-07-09 DIAGNOSIS — S06.0XAA CONCUSSION WITH LOSS OF CONSCIOUSNESS STATUS UNKNOWN, INITIAL ENCOUNTER: ICD-10-CM

## 2024-07-09 DIAGNOSIS — R51.9 HEADACHE, UNSPECIFIED: ICD-10-CM

## 2024-07-09 DIAGNOSIS — Z98.890 OTHER SPECIFIED POSTPROCEDURAL STATES: Chronic | ICD-10-CM
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
## 2024-07-09 DIAGNOSIS — S09.90XA UNSPECIFIED INJURY OF HEAD, INITIAL ENCOUNTER: ICD-10-CM

## 2024-07-09 DIAGNOSIS — S00.532A CONTUSION OF ORAL CAVITY, INITIAL ENCOUNTER: ICD-10-CM

## 2024-07-09 DIAGNOSIS — M54.2 CERVICALGIA: ICD-10-CM
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
## 2024-07-09 DIAGNOSIS — R42 DIZZINESS AND GIDDINESS: ICD-10-CM

## 2024-07-09 PROCEDURE — 99284 EMERGENCY DEPT VISIT MOD MDM: CPT

## 2024-07-09 PROCEDURE — 70486 CT MAXILLOFACIAL W/O DYE: CPT | Mod: 26,MC

## 2024-07-09 PROCEDURE — 70450 CT HEAD/BRAIN W/O DYE: CPT | Mod: 26,MC

## 2024-07-09 PROCEDURE — 72125 CT NECK SPINE W/O DYE: CPT | Mod: 26,MC

## 2024-07-09 RX ORDER — MECLIZINE HCL 25 MG
1 TABLET ORAL
Qty: 10 | Refills: 0
Start: 2024-07-09 | End: 2024-07-13

## 2024-07-09 RX ORDER — ACETAMINOPHEN 325 MG
975 TABLET ORAL ONCE
Refills: 0 | Status: COMPLETED | OUTPATIENT
Start: 2024-07-09 | End: 2024-07-09

## 2024-07-09 RX ORDER — MECLIZINE HCL 25 MG
25 TABLET ORAL ONCE
Refills: 0 | Status: COMPLETED | OUTPATIENT
Start: 2024-07-09 | End: 2024-07-09

## 2024-07-09 RX ADMIN — Medication 975 MILLIGRAM(S): at 17:59

## 2024-07-09 RX ADMIN — Medication 25 MILLIGRAM(S): at 17:59

## 2024-07-15 ENCOUNTER — APPOINTMENT (OUTPATIENT)
Dept: INTERNAL MEDICINE | Facility: CLINIC | Age: 34
End: 2024-07-15

## 2024-07-15 VITALS
TEMPERATURE: 97.6 F | OXYGEN SATURATION: 96 % | BODY MASS INDEX: 34.2 KG/M2 | HEART RATE: 92 BPM | SYSTOLIC BLOOD PRESSURE: 126 MMHG | HEIGHT: 63 IN | WEIGHT: 193 LBS | DIASTOLIC BLOOD PRESSURE: 80 MMHG

## 2024-07-15 DIAGNOSIS — F07.81 POSTCONCUSSIONAL SYNDROME: ICD-10-CM

## 2024-07-15 PROCEDURE — 99213 OFFICE O/P EST LOW 20 MIN: CPT

## 2024-09-17 ENCOUNTER — APPOINTMENT (OUTPATIENT)
Dept: DERMATOLOGY | Facility: CLINIC | Age: 34
End: 2024-09-17
Payer: COMMERCIAL

## 2024-09-17 ENCOUNTER — LABORATORY RESULT (OUTPATIENT)
Age: 34
End: 2024-09-17

## 2024-09-17 DIAGNOSIS — L82.1 OTHER SEBORRHEIC KERATOSIS: ICD-10-CM

## 2024-09-17 DIAGNOSIS — D18.01 HEMANGIOMA OF SKIN AND SUBCUTANEOUS TISSUE: ICD-10-CM

## 2024-09-17 DIAGNOSIS — Z12.83 ENCOUNTER FOR SCREENING FOR MALIGNANT NEOPLASM OF SKIN: ICD-10-CM

## 2024-09-17 DIAGNOSIS — D48.5 NEOPLASM OF UNCERTAIN BEHAVIOR OF SKIN: ICD-10-CM

## 2024-09-17 DIAGNOSIS — D22.9 MELANOCYTIC NEVI, UNSPECIFIED: ICD-10-CM

## 2024-09-17 DIAGNOSIS — R61 GENERALIZED HYPERHIDROSIS: ICD-10-CM

## 2024-09-17 PROCEDURE — 99204 OFFICE O/P NEW MOD 45 MIN: CPT | Mod: 25

## 2024-09-17 PROCEDURE — 11102 TANGNTL BX SKIN SINGLE LES: CPT

## 2024-09-17 RX ORDER — GLYCOPYRROLATE 1 MG/1
1 TABLET ORAL
Qty: 1 | Refills: 2 | Status: ACTIVE | COMMUNITY
Start: 2024-09-17 | End: 1900-01-01

## 2024-09-17 NOTE — HISTORY OF PRESENT ILLNESS
[FreeTextEntry1] : np spots [de-identified] : Referred by: Dr. Marshall   Ms. ANGELA STIETZEL  is a 34 year old F here for evaluation of below  #red spot on R cheek  #spots on skin x yrs, unsure if changing #sweating from scalp x yrs since late teens, occurs at rest in addition to exertion, becoming more embarrassing   #bump on lower back, bothersome #brown bump on chest , occasionally gets scratched/rubbed by necklace/bra strap no personal h/o skin cancer sister w/ breast ca and melanoma gene meds: birth control

## 2024-09-17 NOTE — PHYSICAL EXAM
[FreeTextEntry3] : The patient is well-appearing, in no acute distress, alert and oriented x 3. Mood and affect are normal. A complete cutaneous examination of the scalp, face, neck, chest, abdomen, back, bilateral arms, bilateral legs, buttocks, digits, nails, eyelids, conjunctiva and lips reveals the following significant findings:  multiple symmetric brown macules and papules, few scattered waxy stuck on papules on face, scalp, trunk, extremities few red papules on trunk chest with symmetric brown papule lower back skin colored papule

## 2024-09-17 NOTE — HISTORY OF PRESENT ILLNESS
[FreeTextEntry1] : np spots [de-identified] : Referred by: Dr. Marshall   Ms. ANGELA STIETZEL  is a 34 year old F here for evaluation of below  #red spot on R cheek  #spots on skin x yrs, unsure if changing #sweating from scalp x yrs since late teens, occurs at rest in addition to exertion, becoming more embarrassing   #bump on lower back, bothersome #brown bump on chest , occasionally gets scratched/rubbed by necklace/bra strap no personal h/o skin cancer sister w/ breast ca and melanoma gene meds: birth control

## 2024-09-17 NOTE — ASSESSMENT
[FreeTextEntry1] : nevi SK cherry angiomas - Counseled about clinically and dermatoscopically benign lesions - No treatment indicated at this time - Counseled patient to notify us of any changes  NUBS, lower back r/o IDN vs other Biopsy by Shave  The risks/benefits/alternatives of skin biopsy were explained to the patient, which include and are not limited to bleeding, infection, scarring or discoloration of skin, and recurrence of lesion. Patient expressed understanding of these risks and provided consent to the procedure. Time out with verification of patient and lesion site was performed. Site was prepped with rubbing alcohol, lidocaine with epinephrine was injected for anesthesia, and biopsy was performed. Specimen sent to path. Procedure was without complication and well tolerated. Wound care was discussed.  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

## 2024-09-17 NOTE — CONSULT LETTER
[Dear  ___] : Dear  [unfilled], [Consult Letter:] : I had the pleasure of evaluating your patient, [unfilled]. [Please see my note below.] : Please see my note below. [Consult Closing:] : Thank you very much for allowing me to participate in the care of this patient.  If you have any questions, please do not hesitate to contact me. [Sincerely,] : Sincerely, [FreeTextEntry3] : Griselda Wilson MD Department of Dermatology [facility]

## 2024-09-17 NOTE — CONSULT LETTER
[Dear  ___] : Dear  [unfilled], [Consult Letter:] : I had the pleasure of evaluating your patient, [unfilled]. [Please see my note below.] : Please see my note below. [Consult Closing:] : Thank you very much for allowing me to participate in the care of this patient.  If you have any questions, please do not hesitate to contact me. [Sincerely,] : Sincerely, [FreeTextEntry3] : Griselda Wilson MD Department of Dermatology [facility]

## 2024-11-26 ENCOUNTER — APPOINTMENT (OUTPATIENT)
Dept: INTERNAL MEDICINE | Facility: CLINIC | Age: 34
End: 2024-11-26
Payer: COMMERCIAL

## 2024-11-26 VITALS
RESPIRATION RATE: 16 BRPM | WEIGHT: 193 LBS | OXYGEN SATURATION: 98 % | TEMPERATURE: 98.2 F | HEART RATE: 86 BPM | BODY MASS INDEX: 34.2 KG/M2 | DIASTOLIC BLOOD PRESSURE: 71 MMHG | HEIGHT: 63 IN | SYSTOLIC BLOOD PRESSURE: 116 MMHG

## 2024-11-26 DIAGNOSIS — G44.209 TENSION-TYPE HEADACHE, UNSPECIFIED, NOT INTRACTABLE: ICD-10-CM

## 2024-11-26 DIAGNOSIS — Z13.228 ENCOUNTER FOR SCREENING FOR OTHER METABOLIC DISORDERS: ICD-10-CM

## 2024-11-26 DIAGNOSIS — Z00.00 ENCOUNTER FOR GENERAL ADULT MEDICAL EXAMINATION W/OUT ABNORMAL FINDINGS: ICD-10-CM

## 2024-11-26 PROCEDURE — 99395 PREV VISIT EST AGE 18-39: CPT | Mod: 25

## 2024-11-26 PROCEDURE — 36415 COLL VENOUS BLD VENIPUNCTURE: CPT

## 2024-11-26 RX ORDER — AMOXICILLIN AND CLAVULANATE POTASSIUM 875; 125 MG/1; MG/1
875-125 TABLET, COATED ORAL
Qty: 14 | Refills: 0 | Status: ACTIVE | COMMUNITY
Start: 2024-11-26 | End: 1900-01-01

## 2024-12-02 LAB
25(OH)D3 SERPL-MCNC: 20.1 NG/ML
ALBUMIN SERPL ELPH-MCNC: 4.4 G/DL
ALP BLD-CCNC: 71 U/L
ALT SERPL-CCNC: 18 U/L
ANION GAP SERPL CALC-SCNC: 13 MMOL/L
APPEARANCE: CLEAR
AST SERPL-CCNC: 17 U/L
BASOPHILS # BLD AUTO: 0.03 K/UL
BASOPHILS NFR BLD AUTO: 0.4 %
BILIRUB SERPL-MCNC: 0.2 MG/DL
BILIRUBIN URINE: NEGATIVE
BLOOD URINE: NEGATIVE
BUN SERPL-MCNC: 13 MG/DL
CALCIUM SERPL-MCNC: 9.6 MG/DL
CHLORIDE SERPL-SCNC: 102 MMOL/L
CHOLEST SERPL-MCNC: 149 MG/DL
CO2 SERPL-SCNC: 21 MMOL/L
COLOR: YELLOW
CREAT SERPL-MCNC: 0.51 MG/DL
EGFR: 125 ML/MIN/1.73M2
EOSINOPHIL # BLD AUTO: 0.07 K/UL
EOSINOPHIL NFR BLD AUTO: 0.8 %
ESTIMATED AVERAGE GLUCOSE: 108 MG/DL
FERRITIN SERPL-MCNC: 222 NG/ML
FOLATE SERPL-MCNC: 6 NG/ML
GLUCOSE QUALITATIVE U: NEGATIVE MG/DL
GLUCOSE SERPL-MCNC: 89 MG/DL
HBA1C MFR BLD HPLC: 5.4 %
HCT VFR BLD CALC: 39.4 %
HDLC SERPL-MCNC: 41 MG/DL
HGB BLD-MCNC: 13.5 G/DL
IMM GRANULOCYTES NFR BLD AUTO: 0.2 %
IRON SATN MFR SERPL: 26 %
IRON SERPL-MCNC: 93 UG/DL
KETONES URINE: NEGATIVE MG/DL
LDLC SERPL CALC-MCNC: 74 MG/DL
LEUKOCYTE ESTERASE URINE: NEGATIVE
LYMPHOCYTES # BLD AUTO: 2.62 K/UL
LYMPHOCYTES NFR BLD AUTO: 31.6 %
MAN DIFF?: NORMAL
MCHC RBC-ENTMCNC: 32 PG
MCHC RBC-ENTMCNC: 34.3 G/DL
MCV RBC AUTO: 93.4 FL
MONOCYTES # BLD AUTO: 0.78 K/UL
MONOCYTES NFR BLD AUTO: 9.4 %
NEUTROPHILS # BLD AUTO: 4.77 K/UL
NEUTROPHILS NFR BLD AUTO: 57.6 %
NITRITE URINE: NEGATIVE
NONHDLC SERPL-MCNC: 108 MG/DL
PH URINE: 6.5
PLATELET # BLD AUTO: 312 K/UL
POTASSIUM SERPL-SCNC: 4.1 MMOL/L
PROT SERPL-MCNC: 6.9 G/DL
PROTEIN URINE: NEGATIVE MG/DL
RBC # BLD: 4.22 M/UL
RBC # FLD: 13.8 %
SODIUM SERPL-SCNC: 136 MMOL/L
SPECIFIC GRAVITY URINE: 1.01
TIBC SERPL-MCNC: 356 UG/DL
TRIGL SERPL-MCNC: 205 MG/DL
TSH SERPL-ACNC: 2.38 UIU/ML
UIBC SERPL-MCNC: 263 UG/DL
UROBILINOGEN URINE: 0.2 MG/DL
VIT B12 SERPL-MCNC: 262 PG/ML
WBC # FLD AUTO: 8.29 K/UL

## 2025-02-08 NOTE — ED ADULT NURSE NOTE - NSFALLRSKASSESSDT_ED_ALL_ED
29-Nov-2020 19:56
Assistance OOB with selected safe patient handling equipment if applicable/Assistance with ambulation/Communicate risk of Fall with Harm to all staff, patient, and family/Monitor gait and stability/Provide patient with walking aids/Provide visual cue: red socks, yellow wristband, yellow gown, etc/Reinforce activity limits and safety measures with patient and family/Bed in lowest position, wheels locked, appropriate side rails in place/Call bell, personal items and telephone in reach/Instruct patient to call for assistance before getting out of bed/chair/stretcher/Non-slip footwear applied when patient is off stretcher/[place] to call system/Physically safe environment - no spills, clutter or unnecessary equipment/Purposeful Proactive Rounding/Room/bathroom lighting operational, light cord in reach

## 2025-03-03 ENCOUNTER — APPOINTMENT (OUTPATIENT)
Dept: ORTHOPEDIC SURGERY | Facility: CLINIC | Age: 35
End: 2025-03-03

## 2025-03-10 ENCOUNTER — NON-APPOINTMENT (OUTPATIENT)
Age: 35
End: 2025-03-10

## 2025-04-08 ENCOUNTER — OUTPATIENT (OUTPATIENT)
Dept: OUTPATIENT SERVICES | Facility: HOSPITAL | Age: 35
LOS: 1 days | End: 2025-04-08
Payer: COMMERCIAL

## 2025-04-08 ENCOUNTER — APPOINTMENT (OUTPATIENT)
Dept: ULTRASOUND IMAGING | Facility: IMAGING CENTER | Age: 35
End: 2025-04-08
Payer: COMMERCIAL

## 2025-04-08 ENCOUNTER — APPOINTMENT (OUTPATIENT)
Dept: MAMMOGRAPHY | Facility: IMAGING CENTER | Age: 35
End: 2025-04-08
Payer: COMMERCIAL

## 2025-04-08 DIAGNOSIS — Z00.8 ENCOUNTER FOR OTHER GENERAL EXAMINATION: ICD-10-CM

## 2025-04-08 DIAGNOSIS — Z98.890 OTHER SPECIFIED POSTPROCEDURAL STATES: Chronic | ICD-10-CM

## 2025-04-08 PROCEDURE — 76641 ULTRASOUND BREAST COMPLETE: CPT | Mod: 26,50

## 2025-04-08 PROCEDURE — 77067 SCR MAMMO BI INCL CAD: CPT | Mod: 26

## 2025-04-08 PROCEDURE — 77063 BREAST TOMOSYNTHESIS BI: CPT

## 2025-04-08 PROCEDURE — 77063 BREAST TOMOSYNTHESIS BI: CPT | Mod: 26

## 2025-04-08 PROCEDURE — 76641 ULTRASOUND BREAST COMPLETE: CPT

## 2025-04-08 PROCEDURE — 77067 SCR MAMMO BI INCL CAD: CPT

## 2025-04-11 ENCOUNTER — APPOINTMENT (OUTPATIENT)
Dept: OTOLARYNGOLOGY | Facility: CLINIC | Age: 35
End: 2025-04-11

## 2025-04-23 ENCOUNTER — OUTPATIENT (OUTPATIENT)
Dept: OUTPATIENT SERVICES | Facility: HOSPITAL | Age: 35
LOS: 1 days | End: 2025-04-23
Payer: COMMERCIAL

## 2025-04-23 ENCOUNTER — APPOINTMENT (OUTPATIENT)
Dept: MAMMOGRAPHY | Facility: IMAGING CENTER | Age: 35
End: 2025-04-23
Payer: COMMERCIAL

## 2025-04-23 DIAGNOSIS — Z00.8 ENCOUNTER FOR OTHER GENERAL EXAMINATION: ICD-10-CM

## 2025-04-23 DIAGNOSIS — Z98.890 OTHER SPECIFIED POSTPROCEDURAL STATES: Chronic | ICD-10-CM

## 2025-04-23 PROCEDURE — 77065 DX MAMMO INCL CAD UNI: CPT | Mod: 26,LT

## 2025-04-23 PROCEDURE — G0279: CPT

## 2025-04-23 PROCEDURE — 77065 DX MAMMO INCL CAD UNI: CPT

## 2025-04-23 PROCEDURE — G0279: CPT | Mod: 26

## 2025-05-12 ENCOUNTER — APPOINTMENT (OUTPATIENT)
Dept: INTERNAL MEDICINE | Facility: CLINIC | Age: 35
End: 2025-05-12

## 2025-06-04 ENCOUNTER — APPOINTMENT (OUTPATIENT)
Dept: INTERNAL MEDICINE | Facility: CLINIC | Age: 35
End: 2025-06-04

## 2025-06-30 ENCOUNTER — APPOINTMENT (OUTPATIENT)
Dept: INTERNAL MEDICINE | Facility: CLINIC | Age: 35
End: 2025-06-30

## 2025-06-30 ENCOUNTER — APPOINTMENT (OUTPATIENT)
Dept: INTERNAL MEDICINE | Facility: CLINIC | Age: 35
End: 2025-06-30
Payer: COMMERCIAL

## 2025-06-30 VITALS
SYSTOLIC BLOOD PRESSURE: 121 MMHG | HEIGHT: 63 IN | DIASTOLIC BLOOD PRESSURE: 88 MMHG | TEMPERATURE: 98.1 F | OXYGEN SATURATION: 98 % | HEART RATE: 96 BPM | WEIGHT: 197.31 LBS | BODY MASS INDEX: 34.96 KG/M2

## 2025-06-30 PROBLEM — R06.83 SNORES: Status: ACTIVE | Noted: 2025-06-30

## 2025-06-30 PROBLEM — J02.9 SORE THROAT: Status: ACTIVE | Noted: 2024-04-03

## 2025-06-30 PROCEDURE — G2211 COMPLEX E/M VISIT ADD ON: CPT

## 2025-06-30 PROCEDURE — 99214 OFFICE O/P EST MOD 30 MIN: CPT

## 2025-06-30 RX ORDER — AZITHROMYCIN 250 MG/1
250 TABLET, FILM COATED ORAL
Qty: 1 | Refills: 0 | Status: ACTIVE | COMMUNITY
Start: 2025-06-30 | End: 1900-01-01

## 2025-08-05 ENCOUNTER — EMERGENCY (EMERGENCY)
Facility: HOSPITAL | Age: 35
LOS: 0 days | Discharge: HOME | End: 2025-08-05
Attending: STUDENT IN AN ORGANIZED HEALTH CARE EDUCATION/TRAINING PROGRAM
Payer: COMMERCIAL

## 2025-08-05 VITALS
DIASTOLIC BLOOD PRESSURE: 94 MMHG | RESPIRATION RATE: 20 BRPM | TEMPERATURE: 98 F | WEIGHT: 195.11 LBS | OXYGEN SATURATION: 98 % | HEART RATE: 102 BPM | HEIGHT: 63 IN | SYSTOLIC BLOOD PRESSURE: 138 MMHG

## 2025-08-05 VITALS — SYSTOLIC BLOOD PRESSURE: 133 MMHG | DIASTOLIC BLOOD PRESSURE: 85 MMHG

## 2025-08-05 DIAGNOSIS — S16.1XXA STRAIN OF MUSCLE, FASCIA AND TENDON AT NECK LEVEL, INITIAL ENCOUNTER: ICD-10-CM

## 2025-08-05 DIAGNOSIS — R51.9 HEADACHE, UNSPECIFIED: ICD-10-CM

## 2025-08-05 DIAGNOSIS — R11.2 NAUSEA WITH VOMITING, UNSPECIFIED: ICD-10-CM
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
## 2025-08-05 DIAGNOSIS — Z98.890 OTHER SPECIFIED POSTPROCEDURAL STATES: Chronic | ICD-10-CM
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
## 2025-08-05 LAB
ALBUMIN SERPL ELPH-MCNC: 3.5 G/DL — SIGNIFICANT CHANGE UP (ref 3.3–5)
ALP SERPL-CCNC: 69 U/L — SIGNIFICANT CHANGE UP (ref 40–120)
ALT FLD-CCNC: 27 U/L — SIGNIFICANT CHANGE UP (ref 12–78)
ANION GAP SERPL CALC-SCNC: 3 MMOL/L — LOW (ref 5–17)
AST SERPL-CCNC: 19 U/L — SIGNIFICANT CHANGE UP (ref 15–37)
BASOPHILS # BLD AUTO: 0.03 K/UL — SIGNIFICANT CHANGE UP (ref 0–0.2)
BASOPHILS NFR BLD AUTO: 0.4 % — SIGNIFICANT CHANGE UP (ref 0–2)
BILIRUB SERPL-MCNC: 0.4 MG/DL — SIGNIFICANT CHANGE UP (ref 0.2–1.2)
BUN SERPL-MCNC: 11 MG/DL — SIGNIFICANT CHANGE UP (ref 7–23)
CALCIUM SERPL-MCNC: 9.1 MG/DL — SIGNIFICANT CHANGE UP (ref 8.5–10.1)
CHLORIDE SERPL-SCNC: 106 MMOL/L — SIGNIFICANT CHANGE UP (ref 96–108)
CO2 SERPL-SCNC: 28 MMOL/L — SIGNIFICANT CHANGE UP (ref 22–31)
CREAT SERPL-MCNC: 0.62 MG/DL — SIGNIFICANT CHANGE UP (ref 0.5–1.3)
EGFR: 119 ML/MIN/1.73M2 — SIGNIFICANT CHANGE UP
EGFR: 119 ML/MIN/1.73M2 — SIGNIFICANT CHANGE UP
EOSINOPHIL # BLD AUTO: 0.07 K/UL — SIGNIFICANT CHANGE UP (ref 0–0.5)
EOSINOPHIL NFR BLD AUTO: 0.9 % — SIGNIFICANT CHANGE UP (ref 0–6)
GLUCOSE SERPL-MCNC: 107 MG/DL — HIGH (ref 70–99)
HCG SERPL-ACNC: <1 MIU/ML — SIGNIFICANT CHANGE UP
HCT VFR BLD CALC: 43.4 % — SIGNIFICANT CHANGE UP (ref 34.5–45)
HGB BLD-MCNC: 14 G/DL — SIGNIFICANT CHANGE UP (ref 11.5–15.5)
IMM GRANULOCYTES NFR BLD AUTO: 0.2 % — SIGNIFICANT CHANGE UP (ref 0–0.9)
LYMPHOCYTES # BLD AUTO: 3.06 K/UL — SIGNIFICANT CHANGE UP (ref 1–3.3)
LYMPHOCYTES # BLD AUTO: 37.9 % — SIGNIFICANT CHANGE UP (ref 13–44)
MCHC RBC-ENTMCNC: 30.9 PG — SIGNIFICANT CHANGE UP (ref 27–34)
MCHC RBC-ENTMCNC: 32.3 G/DL — SIGNIFICANT CHANGE UP (ref 32–36)
MCV RBC AUTO: 95.8 FL — SIGNIFICANT CHANGE UP (ref 80–100)
MONOCYTES # BLD AUTO: 0.72 K/UL — SIGNIFICANT CHANGE UP (ref 0–0.9)
MONOCYTES NFR BLD AUTO: 8.9 % — SIGNIFICANT CHANGE UP (ref 2–14)
NEUTROPHILS # BLD AUTO: 4.18 K/UL — SIGNIFICANT CHANGE UP (ref 1.8–7.4)
NEUTROPHILS NFR BLD AUTO: 51.7 % — SIGNIFICANT CHANGE UP (ref 43–77)
NRBC BLD AUTO-RTO: 0 /100 WBCS — SIGNIFICANT CHANGE UP (ref 0–0)
PLATELET # BLD AUTO: 300 K/UL — SIGNIFICANT CHANGE UP (ref 150–400)
POTASSIUM SERPL-MCNC: 4.2 MMOL/L — SIGNIFICANT CHANGE UP (ref 3.5–5.3)
POTASSIUM SERPL-SCNC: 4.2 MMOL/L — SIGNIFICANT CHANGE UP (ref 3.5–5.3)
PROT SERPL-MCNC: 7.4 GM/DL — SIGNIFICANT CHANGE UP (ref 6–8.3)
RBC # BLD: 4.53 M/UL — SIGNIFICANT CHANGE UP (ref 3.8–5.2)
RBC # FLD: 14.2 % — SIGNIFICANT CHANGE UP (ref 10.3–14.5)
SODIUM SERPL-SCNC: 137 MMOL/L — SIGNIFICANT CHANGE UP (ref 135–145)
WBC # BLD: 8.08 K/UL — SIGNIFICANT CHANGE UP (ref 3.8–10.5)
WBC # FLD AUTO: 8.08 K/UL — SIGNIFICANT CHANGE UP (ref 3.8–10.5)

## 2025-08-05 PROCEDURE — 70450 CT HEAD/BRAIN W/O DYE: CPT | Mod: 26,XU

## 2025-08-05 PROCEDURE — 70496 CT ANGIOGRAPHY HEAD: CPT | Mod: 26

## 2025-08-05 PROCEDURE — 70498 CT ANGIOGRAPHY NECK: CPT | Mod: 26

## 2025-08-05 PROCEDURE — 99285 EMERGENCY DEPT VISIT HI MDM: CPT

## 2025-08-05 RX ORDER — METOCLOPRAMIDE HCL 10 MG
10 TABLET ORAL ONCE
Refills: 0 | Status: COMPLETED | OUTPATIENT
Start: 2025-08-05 | End: 2025-08-05

## 2025-08-05 RX ORDER — DIPHENHYDRAMINE HCL 12.5MG/5ML
50 ELIXIR ORAL ONCE
Refills: 0 | Status: COMPLETED | OUTPATIENT
Start: 2025-08-05 | End: 2025-08-05

## 2025-08-05 RX ORDER — KETOROLAC TROMETHAMINE 30 MG/ML
15 INJECTION, SOLUTION INTRAMUSCULAR; INTRAVENOUS ONCE
Refills: 0 | Status: DISCONTINUED | OUTPATIENT
Start: 2025-08-05 | End: 2025-08-05

## 2025-08-05 RX ADMIN — Medication 10 MILLIGRAM(S): at 11:44

## 2025-08-05 RX ADMIN — KETOROLAC TROMETHAMINE 15 MILLIGRAM(S): 30 INJECTION, SOLUTION INTRAMUSCULAR; INTRAVENOUS at 11:44

## 2025-08-05 RX ADMIN — Medication 1000 MILLILITER(S): at 11:44

## 2025-08-05 RX ADMIN — Medication 50 MILLIGRAM(S): at 11:45

## 2025-09-09 DIAGNOSIS — G47.33 OBSTRUCTIVE SLEEP APNEA (ADULT) (PEDIATRIC): ICD-10-CM

## 2025-09-16 ENCOUNTER — APPOINTMENT (OUTPATIENT)
Dept: VASCULAR SURGERY | Facility: CLINIC | Age: 35
End: 2025-09-16
Payer: COMMERCIAL

## 2025-09-16 PROCEDURE — 99204 OFFICE O/P NEW MOD 45 MIN: CPT

## 2025-09-16 PROCEDURE — 93970 EXTREMITY STUDY: CPT

## (undated) DEVICE — DRSG OPSITE 13.75 X 4"

## (undated) DEVICE — DRAPE 3/4 SHEET 52X76"

## (undated) DEVICE — LIGASURE SMALL JAW

## (undated) DEVICE — DRSG STERISTRIPS 0.5 X 4"

## (undated) DEVICE — SPECIMEN CONTAINER 100ML

## (undated) DEVICE — VENODYNE/SCD SLEEVE CALF MEDIUM

## (undated) DEVICE — SUT SOFSILK 2-0 18" C-23

## (undated) DEVICE — MEDICATION LABELS W MARKER

## (undated) DEVICE — SUCTION YANKAUER NO CONTROL VENT

## (undated) DEVICE — PREP CHLORAPREP HI-LITE ORANGE 26ML

## (undated) DEVICE — GLV 7.5 PROTEXIS (WHITE)

## (undated) DEVICE — SOL IRR POUR NS 0.9% 500ML

## (undated) DEVICE — SOL IRR POUR H2O 250ML

## (undated) DEVICE — DRSG CURITY GAUZE SPONGE 4 X 4" 12-PLY

## (undated) DEVICE — DRAPE TOWEL BLUE 17" X 24"

## (undated) DEVICE — DRAPE CHEST BREAST 106" X 122"

## (undated) DEVICE — SUT POLYSORB 3-0 30" V-20 UNDYED

## (undated) DEVICE — DRAIN BLAKE 15FR BARD CHANNEL

## (undated) DEVICE — STAPLER SKIN VISI-STAT 35 WIDE

## (undated) DEVICE — SYR ASEPTO

## (undated) DEVICE — DRSG TEGADERM 6"X8"

## (undated) DEVICE — SUT BIOSYN 4-0 18" P-12

## (undated) DEVICE — DRAPE INSTRUMENT POUCH 6.75" X 11"

## (undated) DEVICE — BLADE SCALPEL SAFETYLOCK #15

## (undated) DEVICE — PACK MAJOR ABDOMINAL WITH LAP

## (undated) DEVICE — DRAIN JACKSON PRATT 10MM FLAT FULL NO TROCAR

## (undated) DEVICE — WARMING BLANKET LOWER ADULT

## (undated) DEVICE — DRAPE 1/2 SHEET 40X57"

## (undated) DEVICE — DRSG OPSITE 2.5 X 2"

## (undated) DEVICE — POSITIONER FOAM EGG CRATE ULNAR 2PCS (PINK)

## (undated) DEVICE — DRAIN RESERVOIR FOR JACKSON PRATT 100CC CARDINAL

## (undated) DEVICE — ELCTR ROCKER SWITCH PENCIL BLUE 10FT

## (undated) DEVICE — DRAPE MAYO STAND 30"

## (undated) DEVICE — MARKING PEN W RULER

## (undated) DEVICE — DRAPE LIGHT HANDLE COVER (BLUE)

## (undated) DEVICE — BLADE SCALPEL SAFETYLOCK #10

## (undated) DEVICE — FOLEY TRAY 16FR 5CC LTX UMETER CLOSED